# Patient Record
Sex: FEMALE | Race: WHITE
[De-identification: names, ages, dates, MRNs, and addresses within clinical notes are randomized per-mention and may not be internally consistent; named-entity substitution may affect disease eponyms.]

---

## 2020-06-06 ENCOUNTER — NURSE TRIAGE (OUTPATIENT)
Dept: OTHER | Facility: CLINIC | Age: 45
End: 2020-06-06

## 2020-06-06 NOTE — TELEPHONE ENCOUNTER
the past 24 hours? \"      Taking fluids and tolerating rice. Has to have a stool after eating. 8. HYDRATION: \"Any signs of dehydration? \" (e.g., dry mouth [not just dry lips], too weak to stand, dizziness, new weight loss) \"When did you last urinate? \"      She feels dehydrated d/t constantly thirsty. Mouth moist.    9. EXPOSURE: \"Have you traveled to a foreign country recently? \" \"Have you been exposed to anyone with diarrhea? \" \"Could you have eaten any food that was spoiled? \"      Denies    10. ANTIBIOTIC USE: \"Are you taking antibiotics now or have you taken antibiotics in the past 2 months? \"        Denies    11. OTHER SYMPTOMS: \"Do you have any other symptoms? \" (e.g., fever, blood in stool)        T99.0. Denies blood in stool. 12. PREGNANCY: \"Is there any chance you are pregnant? \" \"When was your last menstrual period? \"        denies    Protocols used: DIARRHEA-ADULT-AH    Patient called [place] [facility]) to schedule appointment, with red flag complaint, transferred to RN access for triage. See above questions and answers. Caller talking full sentences without any distress on phone. Discussed disposition and patient agreeable. Discussed potential consequences for not following disposition recommendation. Aware to call back with with any concerns or persistent, worsening, or new symptoms develop. Discussed UC/ED and patient is agreeable. Please do not respond to the triage nurse through this encounter. Any subsequent communication should be directly with the patient.

## 2020-10-09 ENCOUNTER — NURSE TRIAGE (OUTPATIENT)
Dept: OTHER | Facility: CLINIC | Age: 45
End: 2020-10-09

## 2020-10-09 ENCOUNTER — TELEPHONE (OUTPATIENT)
Dept: FAMILY MEDICINE CLINIC | Age: 45
End: 2020-10-09

## 2020-10-09 NOTE — TELEPHONE ENCOUNTER
Right ear started a month ago with drainage. Then left ear. Now it is in both and the right is worse. States there is clear drainage and neck pain/headache. Reason for Disposition   White, yellow, or green discharge    Answer Assessment - Initial Assessment Questions  1. LOCATION: \"Which ear is involved? \"      Both     2. ONSET: \"When did the ear start hurting\"       3rd time this month. Complete medication and drops from urgent care. Symptoms are back. Both ears itching and fluid coming out of them. Clear fluid. 3. SEVERITY: \"How bad is the pain? \"  (Scale 1-10; mild, moderate or severe)    - MILD (1-3): doesn't interfere with normal activities     - MODERATE (4-7): interferes with normal activities or awakens from sleep     - SEVERE (8-10): excruciating pain, unable to do any normal activities       Ear pain:     4. URI SYMPTOMS: \"Do you have a runny nose or cough? \"     Sore throat. 5. FEVER: \"Do you have a fever? \" If so, ask: \"What is your temperature, how was it measured, and when did it start? \"      No   6. CAUSE: \"Have you been swimming recently? \", \"How often do you use Q-TIPS? \", \"Have you had any recent air travel or scuba diving? \"      \"Was told it was simmers ear at urgent care\". 7. OTHER SYMPTOMS: \"Do you have any other symptoms? \" (e.g., headache, stiff neck, dizziness, vomiting, runny nose, decreased hearing)      Headache, no stiff neck, no other symptoms. 8. PREGNANCY: \"Is there any chance you are pregnant? \" \"When was your last menstrual period? \"      *No Answer*    Protocols used: EARACHE-ADULT-[place]    Thank you for allowing me to participate in the care of your patient. The patient was connected to triage in response to information provided to the [facility]. Please do not respond through this encounter as the response is not directed to a shared pool.

## 2020-10-09 NOTE — TELEPHONE ENCOUNTER
Please see NT notes regarding the pt needing to be seen today for an ear infection. Pt states this is her third time having an infection. If no appt time available, please see if something can be called in for the pt today.

## 2020-10-09 NOTE — TELEPHONE ENCOUNTER
Patient hasn't been seen in over 5 yrs. Per Dr Shelly Camp - pt advised that she would need to be seen at urgent care. Patient was upset that we \"just drop patients\" because they haven't been seen in 3 years. I apologized and let her know that we could schedule a new patient appointment for her to reestablish or she could establish with another provider.

## 2024-11-14 ENCOUNTER — HOSPITAL ENCOUNTER (EMERGENCY)
Age: 49
Discharge: HOME | End: 2024-11-14
Attending: EMERGENCY MEDICINE
Payer: COMMERCIAL

## 2024-11-14 ENCOUNTER — TELEPHONE (OUTPATIENT)
Age: 49
End: 2024-11-14

## 2024-11-14 ENCOUNTER — APPOINTMENT (OUTPATIENT)
Age: 49
End: 2024-11-14
Payer: COMMERCIAL

## 2024-11-14 VITALS
SYSTOLIC BLOOD PRESSURE: 132 MMHG | DIASTOLIC BLOOD PRESSURE: 80 MMHG | TEMPERATURE: 98 F | OXYGEN SATURATION: 99 % | HEART RATE: 75 BPM | HEIGHT: 63 IN | BODY MASS INDEX: 26.58 KG/M2 | WEIGHT: 150 LBS | RESPIRATION RATE: 18 BRPM

## 2024-11-14 DIAGNOSIS — M25.561 ACUTE PAIN OF RIGHT KNEE: Primary | ICD-10-CM

## 2024-11-14 PROCEDURE — 73562 X-RAY EXAM OF KNEE 3: CPT

## 2024-11-14 PROCEDURE — 99284 EMERGENCY DEPT VISIT MOD MDM: CPT

## 2024-11-14 PROCEDURE — 96372 THER/PROPH/DIAG INJ SC/IM: CPT

## 2024-11-14 PROCEDURE — 6360000002 HC RX W HCPCS: Performed by: EMERGENCY MEDICINE

## 2024-11-14 RX ORDER — ONDANSETRON 4 MG/1
4 TABLET, ORALLY DISINTEGRATING ORAL 3 TIMES DAILY PRN
Qty: 21 TABLET | Refills: 0 | Status: SHIPPED | OUTPATIENT
Start: 2024-11-14

## 2024-11-14 RX ORDER — OXYCODONE AND ACETAMINOPHEN 5; 325 MG/1; MG/1
1 TABLET ORAL EVERY 6 HOURS PRN
Qty: 12 TABLET | Refills: 0 | Status: SHIPPED | OUTPATIENT
Start: 2024-11-14 | End: 2024-11-17

## 2024-11-14 RX ORDER — KETOROLAC TROMETHAMINE 30 MG/ML
30 INJECTION, SOLUTION INTRAMUSCULAR; INTRAVENOUS ONCE
Status: COMPLETED | OUTPATIENT
Start: 2024-11-14 | End: 2024-11-14

## 2024-11-14 RX ADMIN — KETOROLAC TROMETHAMINE 30 MG: 30 INJECTION, SOLUTION INTRAMUSCULAR at 06:45

## 2024-11-14 ASSESSMENT — PAIN - FUNCTIONAL ASSESSMENT: PAIN_FUNCTIONAL_ASSESSMENT: 0-10

## 2024-11-14 ASSESSMENT — PAIN SCALES - GENERAL: PAINLEVEL_OUTOF10: 10

## 2024-11-14 ASSESSMENT — LIFESTYLE VARIABLES
HOW OFTEN DO YOU HAVE A DRINK CONTAINING ALCOHOL: NEVER
HOW MANY STANDARD DRINKS CONTAINING ALCOHOL DO YOU HAVE ON A TYPICAL DAY: PATIENT DOES NOT DRINK

## 2024-11-14 NOTE — TELEPHONE ENCOUNTER
Appointment Request     Patient requesting earlier appointment: Yes  Appointment offered to patient: 11/18/2024  Patient Contact Number: [phone]    PATIENT WOULD LIKE TO COME IN 11/15

## 2024-11-14 NOTE — DISCHARGE INSTRUCTIONS
Please use your knee immobilizer until told to discontinue by the orthopedic surgeon.  Also, I would suggest you are nonweightbearing and please use your crutches to ambulate.  Elevate your knee is much as possible and you may apply ice 3-4 times per day for 20 minutes at a time.

## 2024-11-14 NOTE — ED NOTES
Registration personnel stopped by nursing station to report concerns that patient would fall. Pt had previously been provided call light by MARINO Marshall. Nursing staff responded to pt's room and inquired if she needed assistance, reported concerns that she might fall and requested pt use her call light. Pt stated she did not want nursing assistance MARINO Marshall provided medications and assisted pt to a comfortable position. He reports she then got back up and resumed walking around the room.

## 2024-11-14 NOTE — TELEPHONE ENCOUNTER
Called patient and let her know that Dr. Brown doesn't have any available appointments on 11/15. She was crying because her knee hurts so bad and pain medication is not helping. Advised patient to go back to the ER. We will see her Monday for evaluation. Dr. Brown is aware and agrees with plan.

## 2024-11-18 ENCOUNTER — HOSPITAL ENCOUNTER (OUTPATIENT)
Age: 49
Discharge: HOME | End: 2024-11-18
Attending: ORTHOPAEDIC SURGERY
Payer: COMMERCIAL

## 2024-11-18 ENCOUNTER — OFFICE VISIT (OUTPATIENT)
Age: 49
End: 2024-11-18
Payer: COMMERCIAL

## 2024-11-18 VITALS — WEIGHT: 150 LBS | BODY MASS INDEX: 26.58 KG/M2 | HEIGHT: 63 IN

## 2024-11-18 DIAGNOSIS — S83.211A BUCKET-HANDLE TEAR OF MEDIAL MENISCUS OF RIGHT KNEE AS CURRENT INJURY, INITIAL ENCOUNTER: Primary | ICD-10-CM

## 2024-11-18 DIAGNOSIS — S83.211A BUCKET-HANDLE TEAR OF MEDIAL MENISCUS OF RIGHT KNEE AS CURRENT INJURY, INITIAL ENCOUNTER: ICD-10-CM

## 2024-11-18 PROCEDURE — 73721 MRI JNT OF LWR EXTRE W/O DYE: CPT

## 2024-11-18 PROCEDURE — L1832 KO ADJ JNT POS R SUP PRE CST: HCPCS | Performed by: ORTHOPAEDIC SURGERY

## 2024-11-18 PROCEDURE — 99204 OFFICE O/P NEW MOD 45 MIN: CPT | Performed by: ORTHOPAEDIC SURGERY

## 2024-11-18 RX ORDER — IBUPROFEN 800 MG/1
800 TABLET, FILM COATED ORAL EVERY 8 HOURS PRN
COMMUNITY

## 2024-11-18 RX ORDER — METHYLPREDNISOLONE 4 MG/1
TABLET ORAL
Qty: 1 KIT | Refills: 0 | Status: SHIPPED | OUTPATIENT
Start: 2024-11-18

## 2024-11-18 RX ORDER — METHOCARBAMOL 500 MG/1
500 TABLET, FILM COATED ORAL
COMMUNITY
Start: 2024-09-27

## 2024-11-18 RX ORDER — OXYCODONE HYDROCHLORIDE 5 MG/1
5 TABLET ORAL EVERY 6 HOURS PRN
Qty: 28 TABLET | Refills: 0 | Status: SHIPPED | OUTPATIENT
Start: 2024-11-18 | End: 2024-11-25

## 2024-11-18 RX ORDER — NAPROXEN 500 MG/1
500 TABLET ORAL 2 TIMES DAILY
COMMUNITY
Start: 2024-09-27

## 2024-11-18 NOTE — PROGRESS NOTES
has sustained a locked, bucket-handle tear of her medial meniscus.  We will go ahead and get a stat MRI for further assessment.  I have prepared her that based on the findings she will likely need to undergo arthroscopic intervention this week.  She will stay on her crutches.  Will go ahead and get her fitted for a T scope knee brace.  Depending on the MRI findings we will be able to better  her regarding treatment specifics as well as recovery expectations.  We will refill her pain medication with oxycodone as she feels like the tylenol component may be making her nauseated.    Rhea Orellana understands and accepts this course of care

## 2025-04-29 ENCOUNTER — OFFICE VISIT (OUTPATIENT)
Dept: URGENT CARE | Age: 50
End: 2025-04-29

## 2025-04-29 VITALS
RESPIRATION RATE: 16 BRPM | HEIGHT: 64 IN | TEMPERATURE: 98.3 F | DIASTOLIC BLOOD PRESSURE: 84 MMHG | HEART RATE: 96 BPM | OXYGEN SATURATION: 97 % | BODY MASS INDEX: 25.95 KG/M2 | WEIGHT: 152 LBS | SYSTOLIC BLOOD PRESSURE: 126 MMHG

## 2025-04-29 DIAGNOSIS — M25.532 LEFT WRIST PAIN: ICD-10-CM

## 2025-04-29 DIAGNOSIS — M67.432 GANGLION CYST OF DORSUM OF LEFT WRIST: Primary | ICD-10-CM

## 2025-04-29 PROBLEM — M24.552 HIP FLEXOR TIGHTNESS, LEFT: Status: RESOLVED | Noted: 2024-11-13 | Resolved: 2025-04-29

## 2025-04-29 PROBLEM — R29.898 WEAKNESS OF LEFT LOWER EXTREMITY: Status: RESOLVED | Noted: 2024-11-13 | Resolved: 2025-04-29

## 2025-04-29 ASSESSMENT — VISUAL ACUITY: OU: 1

## 2025-04-29 NOTE — PROGRESS NOTES
Rhea Orellana (: 1975) is a 49 y.o. 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 Ganglion cyst of dorsum of left wrist  M67.432 [facility] [employer] Wrist Brace, Left ()     [facility]     ORTHOPEDIC INJURY TREATMENT      2. Left wrist pain  M25.532 [facility]          - Ganglion Cyst of the Left Wrist:  With exam concerning for left wrist pain centered over a 1cm induration that is slightly mobile with wrist at rest, and without fluctuance, erythema, or other concerns for abscess, there is concern for a ganglion cyst of the left wrist.  Low concern for fractures, dislocations, compartment syndrome, hematomas, blood clots, lymphedema, abscess, cellulitis, osteomyelitis, abrasions, lacerations, or neurovascular compromise.  Wrist Splint applied in clinic to help provide compression, support, and to help immobilize the wrist  splint applied by NASEEM Marinelli, as noted below in procedures, without complications and without evidence of neurovascular compromise as confirmed by the provider.  Recommended patient continue taking her previously prescribed ibuprofen 600mg prescribed for pain relief  RICE therapy, as discussed  Referral placed for orthopedic follow up to continue evaluation and treatment of the injury.    Discussed PCP follow up for persisting or worsening symptoms, or to return to the clinic if unable to obtain PCP follow up for worsening symptoms.    The patient tolerated their visit well. A time was given to answer questions and a plan was established, proposed, and was agreed upon. Reviewed AVS with treatment instructions and answered questions

## 2025-04-29 NOTE — PATIENT INSTRUCTIONS
Rhea,    Thank you for trusting [facility] with your care. Your decision to come to us means a lot and we are honored to be part of your healthcare journey. We value your trust and hope your experience with us was positive and met your expectations.    We're always looking for ways to improve, and your feedback is incredibly important to us. You will receive a text or email soon asking you how your visit went. for If you could take a moment to share your thoughts, it would mean the world to us. Your input helps us better serve you and others in the community.     Thank you again for choosing us. We're grateful for the opportunity to care for you and your loved ones. We hope to see you again - though we always wish you health and wellness!    Warm regards,    [facility]    Gerson Duarte PA-C, Ruma (Clinic Manager+Registration), and Samantha (Medical Assistant)         Exam is concerning for a Ganglion Cyst  Continue taking your ibuprofen 600mg for pain relief.  You may alternate this with up to 500 mg of acetaminophen (Extra Strength Tylenol), every six hours. Do not take more than 4000 mg of acetaminophen from all sources in a 24-hour period.   Wrist brace was provided in clinic to help support the wrist.  Rest and elevate the wrist.   Apply cold compresses intermittently as needed.  Cold compresses for 15-20 minutes, with 30-60 minutes between applications.  Follow up with the referral placed to orthopedics to continue evaluation and treatment of the injury.

## 2025-05-01 ENCOUNTER — OFFICE VISIT (OUTPATIENT)
Age: 50
End: 2025-05-01

## 2025-05-01 VITALS — WEIGHT: 152 LBS | BODY MASS INDEX: 25.95 KG/M2 | HEIGHT: 64 IN

## 2025-05-01 DIAGNOSIS — M67.432 GANGLION CYST OF DORSUM OF LEFT WRIST: Primary | ICD-10-CM

## 2025-05-01 RX ORDER — METHYLPREDNISOLONE 4 MG/1
TABLET ORAL
Qty: 1 KIT | Refills: 0 | Status: SHIPPED | OUTPATIENT
Start: 2025-05-01

## 2025-05-18 PROBLEM — M67.432 GANGLION CYST OF DORSUM OF LEFT WRIST: Status: ACTIVE | Noted: 2025-05-18
